# Patient Record
Sex: MALE | Race: WHITE | Employment: UNEMPLOYED | URBAN - METROPOLITAN AREA
[De-identification: names, ages, dates, MRNs, and addresses within clinical notes are randomized per-mention and may not be internally consistent; named-entity substitution may affect disease eponyms.]

---

## 2017-08-16 ENCOUNTER — TELEPHONE (OUTPATIENT)
Dept: INTERNAL MEDICINE | Facility: CLINIC | Age: 22
End: 2017-08-16

## 2018-09-10 NOTE — PROGRESS NOTES
"  SUBJECTIVE:   Sincere Sullivan is a 22 year old male who presents to clinic today for the following health issues:    HPI  Acute Illness   Acute illness concerns: Sinuses   Onset: couple weeks    Fever: no    Chills/Sweats: no    Headache (location?): no    Sinus Pressure:YES, facial pain, mucopurulent discharge     Conjunctivitis:  YES: both    Ear Pain: pressure    Rhinorrhea: YES    Congestion: YES    Sore Throat: YES     Cough: YES-productive of yellow sputum, productive of green sputum    Wheeze: YES    Decreased Appetite: YES    Nausea: no    Vomiting: no    Diarrhea:  no    Dysuria/Freq.: no    Fatigue/Achiness: YES    Sick/Strep Exposure: YES     Therapies Tried and outcome: Nasal saline - helps some with congestion     - Maybe started as his seasonal allergies   - Doesn't take anything OTC for his allergies   - Reports yellow discharge that is worsening in color       Problem list and histories reviewed & adjusted, as indicated.  Additional history: as documented      ROS:  Constitutional, HEENT, cardiovascular, pulmonary, gi and gu systems are negative, except as otherwise noted.    OBJECTIVE:   /60  Pulse 92  Temp 97.6  F (36.4  C) (Temporal)  Resp 16  Ht 5' 10.79\" (1.798 m)  Wt 162 lb 9.6 oz (73.8 kg)  SpO2 100%  BMI 22.81 kg/m2  Body mass index is 22.81 kg/(m^2).  GENERAL APPEARANCE: mildly ill appearing, alert and no distress  EYES: Eyes grossly normal to inspection, PERRLA, conjunctivae and sclerae without injection or discharge, EOM intact   HENT: Bilateral ear canals without erythema or cerumen, bilateral TM's pearly grey with normal light reflex, no effusion, injection, or bulging, nasal turbinates with mod-servere swelling and erythema, yellow-green nasal discharge, mouth without ulcers or lesions, oropharynx slightly erythematous without edema or exudate, post nasal drip present, oral mucous membranes moist, mild bilateral maxillary and frontal sinus tenderness   NECK: Bilateral " anterior cervical adenopathy, no adenopathy in posterior cervical or supraclavicular regions  RESP: Lungs clear to auscultation - no rales, rhonchi or wheezes   CV: Regular rates and rhythm, normal S1 S2, no S3 or S4, no murmur, click or rub  MS: No musculoskeletal defects are noted and gait is age appropriate without ataxia   SKIN: No suspicious lesions or rashes, hydration status appears adeuqate with normal skin turgor   PSYCH: Alert and oriented x3; speech- coherent , normal rate and volume; able to articulate logical thoughts, able to abstract reason, no tangential thoughts, no hallucinations or delusions, mentation appears normal, Mood is euthymic. Affect is appropriate for this mood state and bright. Thought content is free of suicidal ideation, hallucinations, and delusions. Dress is adequate and upkept. Eye contact is good during conversation.         Diagnostic Test Results:  none     ASSESSMENT/PLAN:       ICD-10-CM    1. Acute sinusitis with symptoms > 10 days J01.90 amoxicillin-clavulanate (AUGMENTIN) 875-125 MG per tablet     - Due to exam findings and length of symptoms, will treat   - Discussed antibiotic use, duration, and side effects  - Can continue with nasal saline   - Other OTC and self care regimens discussed   - Encouraged rest and fluids   - Hand out given     The patient indicates understanding of these issues and agrees with the plan.    Follow up: MIKY Boswell PA-C  [facility]

## 2018-09-11 ENCOUNTER — OFFICE VISIT (OUTPATIENT)
Dept: FAMILY MEDICINE | Facility: OTHER | Age: 23
End: 2018-09-11
Payer: COMMERCIAL

## 2018-09-11 VITALS
OXYGEN SATURATION: 100 % | BODY MASS INDEX: 22.76 KG/M2 | HEART RATE: 92 BPM | DIASTOLIC BLOOD PRESSURE: 60 MMHG | HEIGHT: 71 IN | RESPIRATION RATE: 16 BRPM | TEMPERATURE: 97.6 F | SYSTOLIC BLOOD PRESSURE: 118 MMHG | WEIGHT: 162.6 LBS

## 2018-09-11 DIAGNOSIS — J01.90 ACUTE SINUSITIS WITH SYMPTOMS > 10 DAYS: Primary | ICD-10-CM

## 2018-09-11 PROCEDURE — 99213 OFFICE O/P EST LOW 20 MIN: CPT | Performed by: PHYSICIAN ASSISTANT

## 2018-09-11 ASSESSMENT — PAIN SCALES - GENERAL: PAINLEVEL: NO PAIN (0)

## 2018-09-11 NOTE — PATIENT INSTRUCTIONS
Sinusitis           What is sinusitis?   Sinusitis is swollen, infected linings of the sinuses. The sinuses are hollow spaces in the bones of your face and skull. They connect with the nose through small openings. Like the nose, their linings make mucus.   How does it occur?   Sinusitis occurs when the sinus linings become infected. The passageways from the sinuses to the nose are very narrow. Swelling and mucus may block the passageways. This leads to pressure changes in the sinuses that can be painful.   A number of things can cause swelling and sinusitis. Most often it's allergens (things that cause allergies, like pollen and mold) and viruses, such as viruses that cause the common cold. Whether the cause is allergies or a virus, the sinus linings can swell. When swelling causes the sinus passageway to swell shut, bacteria, viruses, and even fungus can be trapped in the sinuses and cause a sinus infection.   If your nasal bones have been injured or are deformed, causing partial blockage of the sinus openings, you are more likely to get sinusitis.   What are the symptoms?   Symptoms include:   feeling of fullness or pressure in your head   a headache that is most painful when you first wake up in the morning or when you bend your head down or forward   pain above or below your eyes   aching in the upper jaw and teeth   runny or stuffy nose   cough, especially at night   fluid draining down the back of your throat (postnasal drainage)   sore throat in the morning or evening.   How is it diagnosed?   Your healthcare provider will ask about your symptoms and will examine you. You may have an X-ray to look for swelling, fluid, or small benign growths (polyps) in the sinuses.   How is it treated?   Decongestants may help. They may be nonprescription or prescription. They are available as liquids, pills, and nose sprays.   Your healthcare provider may prescribe an antibiotic. In some cases you may need to  take decongestants and antibiotics for several weeks.   You may need nonprescription medicine for pain, such as acetaminophen or ibuprofen. Check with your healthcare provider before you give any medicine that contains aspirin or salicylates to a child or teen. This includes medicines like baby aspirin, some cold medicines, and Pepto Bismol. Children and teens who take aspirin are at risk for a serious illness called Reye's syndrome. Ibuprofen is an NSAID. Nonsteroidal anti-inflammatory medicines (NSAIDs) may cause stomach bleeding and other problems. These risks increase with age. Read the label and take as directed. Unless recommended by your healthcare provider, do not take NSAIDs for more than 10 days for any reason.   If you have chronic or repeated sinus infections, allergies may be the cause. Your healthcare provider may prescribe antihistamine tablets or prescription nasal sprays (steroids or cromolyn) to treat the allergies.   If you have chronic, severe sinusitis that does not respond to treatment with medicines, surgery may be done. The surgeon can create an extra or enlarged passageway in the wall of the sinus cavity. This allows the sinuses to drain more easily through the nasal passages. This should help them stay free of infection.   How long will the effects last?   Symptoms may get better gradually over 3 to 10 days. Depending on what caused the sinusitis and how severe it is, it may last for days or weeks. The symptoms may come back if you do not finish all of your antibiotic.   How can I take care of myself?   Follow your healthcare provider's instructions.   If you are taking an antibiotic, take all of it as directed by your provider. If you stop taking the medicine when your symptoms are gone but before you have taken all of the medicine, symptoms may come back.   Avoid tobacco smoke.   If you have allergies, take care to avoid the things you are allergic to, such as animal dander.   Add  moisture to the air with a humidifier or a vaporizer, unless you have mold allergy (mold may grow in your vaporizer).   Inhale steam from a basin of hot water or shower to help open your sinuses and relieve pain.   Use saline nasal sprays to help wash out nasal passages and clear some mucus from the airways.   Use decongestants as directed on the label or by your provider.   If you are using a nonprescription nasal-spray decongestant, generally you should not use it for more than 3 days. After 3 days it may cause your symptoms to get worse. Ask your healthcare provider if it is OK for you to use a nasal spray decongestant longer than this.   Get plenty of rest.   Drink more fluids to keep the mucus as thin as possible so your sinuses can drain more easily.   Put warm compresses on painful areas.   Take antibiotics as prescribed. Use all of the medicine, even after you feel better. Some sinus infections require 2 to 4 weeks of antibiotic treatment.   See your healthcare provider if the pain lasts for several days or gets worse.   If the sinus areas above or below your eyes are swollen or bulging, see your healthcare provider right away. This symptom may mean that the infection is spreading. A spreading infection can affect other parts of your body--even the brain--and needs to be treated promptly.   How can I help prevent sinusitis?   Treat your colds and allergies promptly. Use decongestants as soon as you start having symptoms.   Do not smoke and stay away from secondhand smoke.   Drink lots of fluids to keep the mucus thin.   Humidify your home if the air is particularly dry.   If you have sinus infections often, consider having allergy tests.   If sinusitis continues to be a problem despite treatment, you might need an exam by an ear, nose, and throat doctor (called an ENT or otolaryngologist). The specialist will check for polyps or a deformed bone that may be blocking your sinuses.     Published by  [employer].  This content is reviewed periodically and is subject to change as new health information becomes available. The information is intended to inform and educate and is not a replacement for medical evaluation, advice, diagnosis or treatment by a healthcare professional.   Developed by [employer].   ? 2010 [facility] and/or its affiliates. All Rights Reserved.   Copyright   Clinical Reference Systems 2011  Adult Health Advisor

## 2018-09-11 NOTE — MR AVS SNAPSHOT
After Visit Summary   9/11/2018    Sincere Sullivan    MRN: [id number]           Patient Information     Date Of Birth          1995        Visit Information        Provider Department      9/11/2018 9:30 AM Yessenia Boswell PA-C [facility]        Today's Diagnoses     Acute sinusitis with symptoms > 10 days    -  1      Care Instructions                  Sinusitis           What is sinusitis?   Sinusitis is swollen, infected linings of the sinuses. The sinuses are hollow spaces in the bones of your face and skull. They connect with the nose through small openings. Like the nose, their linings make mucus.   How does it occur?   Sinusitis occurs when the sinus linings become infected. The passageways from the sinuses to the nose are very narrow. Swelling and mucus may block the passageways. This leads to pressure changes in the sinuses that can be painful.   A number of things can cause swelling and sinusitis. Most often it's allergens (things that cause allergies, like pollen and mold) and viruses, such as viruses that cause the common cold. Whether the cause is allergies or a virus, the sinus linings can swell. When swelling causes the sinus passageway to swell shut, bacteria, viruses, and even fungus can be trapped in the sinuses and cause a sinus infection.   If your nasal bones have been injured or are deformed, causing partial blockage of the sinus openings, you are more likely to get sinusitis.   What are the symptoms?   Symptoms include:   feeling of fullness or pressure in your head   a headache that is most painful when you first wake up in the morning or when you bend your head down or forward   pain above or below your eyes   aching in the upper jaw and teeth   runny or stuffy nose   cough, especially at night   fluid draining down the back of your throat (postnasal drainage)   sore throat in the morning or evening.   How is it diagnosed?   Your  healthcare provider will ask about your symptoms and will examine you. You may have an X-ray to look for swelling, fluid, or small benign growths (polyps) in the sinuses.   How is it treated?   Decongestants may help. They may be nonprescription or prescription. They are available as liquids, pills, and nose sprays.   Your healthcare provider may prescribe an antibiotic. In some cases you may need to take decongestants and antibiotics for several weeks.   You may need nonprescription medicine for pain, such as acetaminophen or ibuprofen. Check with your healthcare provider before you give any medicine that contains aspirin or salicylates to a child or teen. This includes medicines like baby aspirin, some cold medicines, and Pepto Bismol. Children and teens who take aspirin are at risk for a serious illness called Reye's syndrome. Ibuprofen is an NSAID. Nonsteroidal anti-inflammatory medicines (NSAIDs) may cause stomach bleeding and other problems. These risks increase with age. Read the label and take as directed. Unless recommended by your healthcare provider, do not take NSAIDs for more than 10 days for any reason.   If you have chronic or repeated sinus infections, allergies may be the cause. Your healthcare provider may prescribe antihistamine tablets or prescription nasal sprays (steroids or cromolyn) to treat the allergies.   If you have chronic, severe sinusitis that does not respond to treatment with medicines, surgery may be done. The surgeon can create an extra or enlarged passageway in the wall of the sinus cavity. This allows the sinuses to drain more easily through the nasal passages. This should help them stay free of infection.   How long will the effects last?   Symptoms may get better gradually over 3 to 10 days. Depending on what caused the sinusitis and how severe it is, it may last for days or weeks. The symptoms may come back if you do not finish all of your antibiotic.   How can I take care of  myself?   Follow your healthcare provider's instructions.   If you are taking an antibiotic, take all of it as directed by your provider. If you stop taking the medicine when your symptoms are gone but before you have taken all of the medicine, symptoms may come back.   Avoid tobacco smoke.   If you have allergies, take care to avoid the things you are allergic to, such as animal dander.   Add moisture to the air with a humidifier or a vaporizer, unless you have mold allergy (mold may grow in your vaporizer).   Inhale steam from a basin of hot water or shower to help open your sinuses and relieve pain.   Use saline nasal sprays to help wash out nasal passages and clear some mucus from the airways.   Use decongestants as directed on the label or by your provider.   If you are using a nonprescription nasal-spray decongestant, generally you should not use it for more than 3 days. After 3 days it may cause your symptoms to get worse. Ask your healthcare provider if it is OK for you to use a nasal spray decongestant longer than this.   Get plenty of rest.   Drink more fluids to keep the mucus as thin as possible so your sinuses can drain more easily.   Put warm compresses on painful areas.   Take antibiotics as prescribed. Use all of the medicine, even after you feel better. Some sinus infections require 2 to 4 weeks of antibiotic treatment.   See your healthcare provider if the pain lasts for several days or gets worse.   If the sinus areas above or below your eyes are swollen or bulging, see your healthcare provider right away. This symptom may mean that the infection is spreading. A spreading infection can affect other parts of your body--even the brain--and needs to be treated promptly.   How can I help prevent sinusitis?   Treat your colds and allergies promptly. Use decongestants as soon as you start having symptoms.   Do not smoke and stay away from secondhand smoke.   Drink lots of fluids to keep the mucus thin.    Humidify your home if the air is particularly dry.   If you have sinus infections often, consider having allergy tests.   If sinusitis continues to be a problem despite treatment, you might need an exam by an ear, nose, and throat doctor (called an ENT or otolaryngologist). The specialist will check for polyps or a deformed bone that may be blocking your sinuses.     Published by [employer].  This content is reviewed periodically and is subject to change as new health information becomes available. The information is intended to inform and educate and is not a replacement for medical evaluation, advice, diagnosis or treatment by a healthcare professional.   Developed by [employer].   ? 2010 [employer] and/or its affiliates. All Rights Reserved.   Copyright   Clinical Reference Systems 2011  Adult Health Advisor                Follow-ups after your visit        Follow-up notes from your care team     Return if symptoms worsen or fail to improve.      Who to contact     If you have questions or need follow up information about today's clinic visit or your schedule please contact [facility] directly at [phone].  Normal or non-critical lab and imaging results will be communicated to you by [employer], letter or phone within 4 business days after the clinic has received the results. If you do not hear from us within 7 days, please contact the clinic through [employer] or phone. If you have a critical or abnormal lab result, we will notify you by phone as soon as possible.  Submit refill requests through [employer] or call your pharmacy and they will forward the refill request to us. Please allow 3 business days for your refill to be completed.          Additional Information About Your Visit        [employer] Information     [employer] lets you send messages to your doctor, view your test results, renew your prescriptions, schedule appointments and more. To sign up, go to [email or web address] . Click on  "\"Log in\" on the left side of the screen, which will take you to the Welcome page. Then click on \"Sign up Now\" on the right side of the page.     You will be asked to enter the access code listed below, as well as some personal information. Please follow the directions to create your username and password.     Your access code is: [email or web address]  Expires: 12/10/2018  9:42 AM     Your access code will  in 90 days. If you need help or a new code, please call your [facility] clinic or [phone].        Care EveryWhere ID     This is your Care EveryWhere ID. This could be used by other organizations to access your [facility] medical records  [id number]        Your Vitals Were     Pulse Temperature Respirations Height Pulse Oximetry BMI (Body Mass Index)    92 97.6  F (36.4  C) (Temporal) 16 5' 10.79\" (1.798 m) 100% 22.81 kg/m2       Blood Pressure from Last 3 Encounters:   18 118/60   10/23/14 116/75   10/16/14 90/62    Weight from Last 3 Encounters:   18 162 lb 9.6 oz (73.8 kg)   10/23/14 141 lb (64 kg) (27 %)*   10/16/14 131 lb (59.4 kg) (13 %)*     * Growth percentiles are based on [facility] 2-20 Years data.              Today, you had the following     No orders found for display         Today's Medication Changes          These changes are accurate as of 18  9:42 AM.  If you have any questions, ask your nurse or doctor.               Start taking these medicines.        Dose/Directions    amoxicillin-clavulanate 875-125 MG per tablet   Commonly known as:  AUGMENTIN   Used for:  Acute sinusitis with symptoms > 10 days   Started by:  Yessenia Boswell PA-C        Dose:  1 tablet   Take 1 tablet by mouth 2 times daily   Quantity:  20 tablet   Refills:  0            Where to get your medicines      These medications were sent to [facility] [place] - [address] [place] [address]  [address] [place] [address], [place] [address]     Phone:  [phone] "     amoxicillin-clavulanate 875-125 MG per tablet                Primary Care Provider Office Phone # Fax #    [facility] [phone] [address], [address]. \[facility] and Outlook.\"" [id number]        Equal Access to Services     BRYAN CALDERA : Hadpham ahumada haddalyo Soomaali, waaxda luqadaha, qaybta kaalmada adeegyada, waxshruthi kaylyn haycindyn jaime dylancharles smith. So [facility] [phone].    ATENCIÓN: Si habla español, tiene a taylor disposición servicios gratuitos de asistencia lingüística. Llame al [phone].    We comply with applicable federal civil rights laws and [place] laws. We do not discriminate on the basis of race, color, national origin, age, disability, sex, sexual orientation, or gender identity.            Thank you!     Thank you for choosing [facility]  for your care. Our goal is always to provide you with excellent care. Hearing back from our patients is one way we can continue to improve our services. Please take a few minutes to complete the written survey that you may receive in the mail after your visit with us. Thank you!             Your Updated Medication List - Protect others around you: Learn how to safely use, store and throw away your medicines at www.disposemymeds.org.          This list is accurate as of 9/11/18  9:42 AM.  Always use your most recent med list.                   Brand Name Dispense Instructions for use Diagnosis    amoxicillin-clavulanate 875-125 MG per tablet    AUGMENTIN    20 tablet    Take 1 tablet by mouth 2 times daily    Acute sinusitis with symptoms > 10 days

## 2018-10-25 ENCOUNTER — OFFICE VISIT (OUTPATIENT)
Dept: FAMILY MEDICINE | Facility: OTHER | Age: 23
End: 2018-10-25
Payer: COMMERCIAL

## 2018-10-25 VITALS
OXYGEN SATURATION: 97 % | BODY MASS INDEX: 23.71 KG/M2 | HEART RATE: 84 BPM | SYSTOLIC BLOOD PRESSURE: 112 MMHG | TEMPERATURE: 98.6 F | WEIGHT: 169 LBS | DIASTOLIC BLOOD PRESSURE: 68 MMHG

## 2018-10-25 DIAGNOSIS — F12.90 MARIJUANA USE: ICD-10-CM

## 2018-10-25 DIAGNOSIS — Z72.0 TOBACCO ABUSE: ICD-10-CM

## 2018-10-25 DIAGNOSIS — J32.0 CHRONIC MAXILLARY SINUSITIS: Primary | ICD-10-CM

## 2018-10-25 PROCEDURE — 99213 OFFICE O/P EST LOW 20 MIN: CPT | Performed by: PHYSICIAN ASSISTANT

## 2018-10-25 RX ORDER — VARENICLINE TARTRATE 1 MG/1
1 TABLET, FILM COATED ORAL 2 TIMES DAILY
Qty: 56 TABLET | Refills: 2 | Status: SHIPPED | OUTPATIENT
Start: 2018-10-25

## 2018-10-25 RX ORDER — FLUTICASONE PROPIONATE 50 MCG
1-2 SPRAY, SUSPENSION (ML) NASAL DAILY
Qty: 1 BOTTLE | Refills: 3 | Status: SHIPPED | OUTPATIENT
Start: 2018-10-25

## 2018-10-25 RX ORDER — DOXYCYCLINE 100 MG/1
100 CAPSULE ORAL 2 TIMES DAILY
Qty: 28 CAPSULE | Refills: 0 | Status: SHIPPED | OUTPATIENT
Start: 2018-10-25

## 2018-10-25 ASSESSMENT — PAIN SCALES - GENERAL: PAINLEVEL: NO PAIN (0)

## 2018-10-25 NOTE — MR AVS SNAPSHOT
After Visit Summary   10/25/2018    Sincere Sullivan    MRN: [id number]           Patient Information     Date Of Birth          1995        Visit Information        Provider Department      10/25/2018 10:00 AM Sincere Ling PA-C [facility]        Today's Diagnoses     Chronic maxillary sinusitis    -  1    Tobacco abuse        Marijuana use          Care Instructions    Will prescribe an antibiotic called doxycycline to take twice daily for 14 days. Take a daily probiotic or Activia yogurt while you are on this. A good probiotic is Acidophilus or Lactobacillus  Drink plenty of fluids.  Use an over the counter Nettipot or sinus rinse to help with nasal congestion. Mucinex can also be helpful.   I also recommend Flonase to help with nasal swelling.  You should consider a daily Claritin or Zyrtec to help dry up the congestion. Take this daily for a few months.  If symptoms are not improving, let me know and we can get you set up with an ENT specialist.    Work on quitting smoking.    Will start you on Chantix. Start this one week before setting a quit date.  If you have side effects, let me know.    Follow up within the next 6 months for an annual physical.               Follow-ups after your visit        Follow-up notes from your care team     Return in about 6 months (around 4/25/2019) for Physical Exam, Routine Visit.      Who to contact     If you have questions or need follow up information about today's clinic visit or your schedule please contact [facility] directly at [phone].  Normal or non-critical lab and imaging results will be communicated to you by MyChart, letter or phone within 4 business days after the clinic has received the results. If you do not hear from us within 7 days, please contact the clinic through MyChart or phone. If you have a critical or abnormal lab result, we will notify you by phone as soon as possible.  Submit refill  requests through [employer] or call your pharmacy and they will forward the refill request to us. Please allow 3 business days for your refill to be completed.          Additional Information About Your Visit        Care EveryWhere ID     This is your Care EveryWhere ID. This could be used by other organizations to access your [facility] medical records  [id number]        Your Vitals Were     Pulse Temperature Pulse Oximetry BMI (Body Mass Index)          84 98.6  F (37  C) (Temporal) 97% 23.71 kg/m2         Blood Pressure from Last 3 Encounters:   10/25/18 112/68   09/11/18 118/60   10/23/14 116/75    Weight from Last 3 Encounters:   10/25/18 169 lb (76.7 kg)   09/11/18 162 lb 9.6 oz (73.8 kg)   10/23/14 141 lb (64 kg) (27 %)*     * Growth percentiles are based on [facility] 2-20 Years data.              Today, you had the following     No orders found for display         Today's Medication Changes          These changes are accurate as of 10/25/18 10:27 AM.  If you have any questions, ask your nurse or doctor.               Start taking these medicines.        Dose/Directions    doxycycline 100 MG capsule   Commonly known as:  VIBRAMYCIN   Used for:  Chronic maxillary sinusitis   Started by:  Sincere Ling PA-C        Dose:  100 mg   Take 1 capsule (100 mg) by mouth 2 times daily   Quantity:  28 capsule   Refills:  0       fluticasone 50 MCG/ACT spray   Commonly known as:  FLONASE   Used for:  Chronic maxillary sinusitis   Started by:  Sincere Ling PA-C        Dose:  1-2 spray   Spray 1-2 sprays into both nostrils daily   Quantity:  1 Bottle   Refills:  3       * varenicline 0.5 MG X 11 & 1 MG X 42 tablet   Commonly known as:  CHANTIX STARTING MONTH PAK   Used for:  Tobacco abuse   Started by:  Sincere Ling PA-C        Take 0.5 mg tab daily for 3 days, then 0.5 mg tab twice daily for 4 days, then 1 mg twice daily.   Quantity:  53 tablet   Refills:  0       * varenicline 1 MG tablet   Commonly known  as:  CHANTIX   Used for:  Tobacco abuse   Started by:  Sincere Ling PA-C        Dose:  1 mg   Take 1 tablet (1 mg) by mouth 2 times daily   Quantity:  56 tablet   Refills:  2       * Notice:  This list has 2 medication(s) that are the same as other medications prescribed for you. Read the directions carefully, and ask your doctor or other care provider to review them with you.      Stop taking these medicines if you haven't already. Please contact your care team if you have questions.     amoxicillin-clavulanate 875-125 MG per tablet   Commonly known as:  AUGMENTIN   Stopped by:  Sincere Ling PA-C                Where to get your medicines      These medications were sent to [facility] [place] - [address] [place] [address]  [address] [place] [address], [place] [address]     Phone:  [phone]     doxycycline 100 MG capsule    fluticasone 50 MCG/ACT spray    varenicline 0.5 MG X 11 & 1 MG X 42 tablet    varenicline 1 MG tablet                Primary Care Provider Office Phone # Fax #    [facility] [phone] [address] [facility] [id number]        Equal Access to Services     GIDEON CALDERA AH: Hadii antonio ahumada hadasho Soomaali, waaxda luqadaha, qaybta kaalmada adeegyada, anton smith. So [facility] [phone].    ATENCIÓN: Si habla español, tiene a taylor disposición servicios gratuitos de asistencia lingüística. [facility] [phone].    We comply with applicable federal civil rights laws and [place] laws. We do not discriminate on the basis of race, color, national origin, age, disability, sex, sexual orientation, or gender identity.            Thank you!     Thank you for choosing [facility]  for your care. Our goal is always to provide you with excellent care. Hearing back from our patients is one way we can continue to improve our services. Please take a few minutes to complete  the written survey that you may receive in the mail after your visit with us. Thank you!             Your Updated Medication List - Protect others around you: Learn how to safely use, store and throw away your medicines at www.disposemDigital Sign[employer]s.org.          This list is accurate as of 10/25/18 10:27 AM.  Always use your most recent med list.                   Brand Name Dispense Instructions for use Diagnosis    doxycycline 100 MG capsule    VIBRAMYCIN    28 capsule    Take 1 capsule (100 mg) by mouth 2 times daily    Chronic maxillary sinusitis       fluticasone 50 MCG/ACT spray    FLONASE    1 Bottle    Spray 1-2 sprays into both nostrils daily    Chronic maxillary sinusitis       * varenicline 0.5 MG X 11 & 1 MG X 42 tablet    CHANTIX STARTING MONTH AMBROSIO    53 tablet    Take 0.5 mg tab daily for 3 days, then 0.5 mg tab twice daily for 4 days, then 1 mg twice daily.    Tobacco abuse       * varenicline 1 MG tablet    CHANTIX    56 tablet    Take 1 tablet (1 mg) by mouth 2 times daily    Tobacco abuse       * Notice:  This list has 2 medication(s) that are the same as other medications prescribed for you. Read the directions carefully, and ask your doctor or other care provider to review them with you.

## 2018-10-25 NOTE — PROGRESS NOTES
"  SUBJECTIVE:   Sincere Sullivan is a 22 year old male who presents to clinic today for the following health issues:      HPI   Acute Illness   Acute illness concerns: Sinus  Onset: three years, started with wisdom tooth at that time    Fever: no     Chills/Sweats: no     Headache (location?): YES    Sinus Pressure:YES, \"behind eyes\"    Conjunctivitis:  no    Ear Pain: YES- pressure but no pain    Rhinorrhea: YES    Congestion: YES    Sore Throat: yes     Cough: YES-productive of yellow sputum, productive of green sputum-can feel it in the lungs.     Wheeze: no     Decreased Appetite: no     Nausea: no     Vomiting: no     Diarrhea:  no     Dysuria/Freq.: no     Fatigue/Achiness: YES    Sick/Strep Exposure: YES- also has had an ongoing sinus infection for years-     Therapies Tried and outcome: Had a surgery with ENT- netti pot, nasal sprays previously.       Patient does not want antibiotics if possible but needs a way to treat without using. He has a history of chronic sinusitis for many years and states he had endoscopic sinus surgery in March which helped for awhile although he did have a sinus infection after surgery. He then developed another sinus infection last month and was placed on Augmentin with minimal relief. His symptoms are now worse than they were a month ago with a lot of sinus congestion/pressure, postnasal drainage, and yellow, green nasal drainage along with a productive cough. He has not used any nasal sprays or his Nettipot recently. He is a 1 PPD cigarettes smoker along with daily marijuana use. He is interested in quitting smoking.    Problem list and histories reviewed & adjusted, as indicated.  Additional history: none    ROS:  GENERAL: Denies fever, fatigue, weakness, weight gain, or weight loss.  HEENT: Eyes-Denies pain, redness, loss of vision, double or blurred vision.     Ears/Nose- +Sinus congestion/pressure, postnasal drainage. Denies tinnitus, loss of hearing, epistaxis, decreased " sense of smell. Denies loss of sense of taste, dry mouth, or sore throat.   CARDIOVASCULAR: Denies chest pain, shortness of breath, irregular heartbeats, palpitations, or edema.  RESPIRATORY: +Productive cough. Denies hemoptysis and shortness of breath.  NEUROLOGIC: Denies headache, fainting, dizziness, memory loss, numbness, tingling, or seizures.  PSYCHIATRIC: +Intermittent depression and anxiety. Denies mood swings and thoughts of suicide.    OBJECTIVE:     /68  Pulse 84  Temp 98.6  F (37  C) (Temporal)  Wt 169 lb (76.7 kg)  SpO2 97%  BMI 23.71 kg/m2  Body mass index is 23.71 kg/(m^2).  GENERAL: healthy, alert and no distress  EYES: Eyes grossly normal to inspection, PERRL and conjunctivae and sclerae normal  HENT: ear canals and TM's normal. Nasal mucosa is erythematous and edematous, L>R. Bilateral maxillary sinus tenderness.   NECK: no adenopathy, no asymmetry, masses, or scars and thyroid normal to palpation  RESP: lungs clear to auscultation - no rales, rhonchi or wheezes  CV: regular rate and rhythm, normal S1 S2, no S3 or S4, no murmur, click or rub  PSYCH: mentation appears normal, affect normal/bright    ASSESSMENT/PLAN:       ICD-10-CM    1. Chronic maxillary sinusitis J32.0 doxycycline (VIBRAMYCIN) 100 MG capsule     fluticasone (FLONASE) 50 MCG/ACT spray   2. Tobacco abuse Z72.0 varenicline (CHANTIX STARTING MONTH AMBROSIO) 0.5 MG X 11 & 1 MG X 42 tablet     varenicline (CHANTIX) 1 MG tablet   3. Marijuana use F12.90        1. Due to his continued symptoms of sinusitis with a chronic sinusitis history, will prescribe doxycycline to take twice daily for 14 days. I recommend he take a daily probiotic or Activia yogurt while on this.   Drink plenty of fluids.  Use an over the counter Nettipot or sinus rinse to help with nasal congestion. Mucinex can also be helpful.   I also recommend Flonase to help with nasal swelling.  I recommend he consider a daily Claritin or Zyrtec to help keep his nasal  passages dry as there may be an allergy component.    If symptoms are not improving, he will let me know and we can get him set up with an ENT specialist.    2-3. I spent 5 minutes providing counseling on smoking cessation. I also recommend he stop the marijuana use. He is interested in trying Chantix if it is not too expensive so will prescribe this. He states he does have some anxiety and depression which is improved with the marijuana and I told him that with this mental health history, he is at higher risk of developing worsening depression or potential suicidal thoughts on the Chantix but he would like to give it a try and I think it is reasonable to try. If he has any worsening depression symptoms or any thoughts of self harm, he was instructed to stop the medication right away and call the clinic. I urged him to set up another appointment to discuss his anxiety and depression in more detail as I do not recommend he coop with this using marijuana.     Sincere Ling PA-C  [facility]

## 2018-10-25 NOTE — PATIENT INSTRUCTIONS
Will prescribe an antibiotic called doxycycline to take twice daily for 14 days. Take a daily probiotic or Activia yogurt while you are on this. A good probiotic is Acidophilus or Lactobacillus  Drink plenty of fluids.  Use an over the counter Nettipot or sinus rinse to help with nasal congestion. Mucinex can also be helpful.   I also recommend Flonase to help with nasal swelling.  You should consider a daily Claritin or Zyrtec to help dry up the congestion. Take this daily for a few months.  If symptoms are not improving, let me know and we can get you set up with an ENT specialist.    Work on quitting smoking.    Will start you on Chantix. Start this one week before setting a quit date.  If you have side effects, let me know.    Follow up within the next 6 months for an annual physical.